# Patient Record
Sex: FEMALE | Race: WHITE | URBAN - METROPOLITAN AREA
[De-identification: names, ages, dates, MRNs, and addresses within clinical notes are randomized per-mention and may not be internally consistent; named-entity substitution may affect disease eponyms.]

---

## 2023-03-15 PROBLEM — F33.0 MAJOR DEPRESSIVE DISORDER, RECURRENT, MILD (HCC): Status: ACTIVE | Noted: 2023-03-15

## 2023-03-15 PROBLEM — F33.0 MAJOR DEPRESSIVE DISORDER, RECURRENT, MILD: Status: ACTIVE | Noted: 2023-03-15

## 2023-06-19 ENCOUNTER — TELEPHONE (OUTPATIENT)
Dept: FAMILY MEDICINE CLINIC | Facility: CLINIC | Age: 33
End: 2023-06-19

## 2023-06-19 NOTE — TELEPHONE ENCOUNTER
Pharmacy Arlene called to speak with nurse to get clarification on directions on medication hydrOXYzine 25 MG Oral Tab.     Please advise

## 2023-06-20 NOTE — TELEPHONE ENCOUNTER
Called and LMOM to call back that Dr Pierre Briggs would like her to come in to discuss her anxiety and using the atarax to control the anxiety.

## 2023-06-20 NOTE — TELEPHONE ENCOUNTER
If she is truly needing hydroxyzine TID for anxiety then I would recommend a visit so we can manage her anxiety a little better.  Thanks

## 2023-06-21 NOTE — TELEPHONE ENCOUNTER
Called and talked to patient and she usually takes this as a sleep aide but not every day. After some discussion she will plan on making an appointmetn to discuss this.

## 2023-08-14 ENCOUNTER — TELEMEDICINE (OUTPATIENT)
Dept: FAMILY MEDICINE CLINIC | Facility: CLINIC | Age: 33
End: 2023-08-14
Payer: MEDICAID

## 2023-08-14 DIAGNOSIS — G47.00 INSOMNIA, UNSPECIFIED TYPE: Primary | ICD-10-CM

## 2023-08-14 DIAGNOSIS — F39 MOOD DISORDER (HCC): ICD-10-CM

## 2023-08-14 RX ORDER — QUETIAPINE FUMARATE 25 MG/1
25 TABLET, FILM COATED ORAL NIGHTLY
Qty: 30 TABLET | Refills: 0 | Status: SHIPPED | OUTPATIENT
Start: 2023-08-14

## 2023-08-14 NOTE — PROGRESS NOTES
Telemedicine video encounter documentation    This video encounter was conducted with the patient's (or proxy's) verbal consent via secure, interactive audio and video telecommunications. The patient (or proxy) was instructed to have this encounter in a suitably private space and to only have persons present to whom they give permission to participate. In addition, patient identity was confirmed by use of name plus two identifiers. Chief Complaint:     No chief complaint on file. Subjective:     Radames Doyle is a 29year old female established patient. Video visit today for:    HPI      Insomnia. Patient reports difficulty falling and staying asleep. She has been using trazodone and hydroxyzine. Trazodone is not helping. She is concerned because she has gone 24 hours without sleeping at least once each week. Every night she has trouble falling asleep and trouble staying asleep. She denies feeling down and depressed, though does endorse passive thoughts of writing suicide note. Denies having a plan or means. Does not endorse intrusive thoughts. Cites reasons for living including support of family members and also she wants to be alive. MDQ positive for periods of time in which she felt more self-confident, periods of time when she got much less sleep and did not miss it, had racing thoughts, were easily distracted, and spent money getting herself into trouble. Does not endorse prior dx of bipolar disorder. Denies using drugs or alcohol. Patient Active Problem List:     Major depressive disorder, recurrent, mild (HCC)      hydrOXYzine 25 MG Oral Tab, Take 1 tablet (25 mg total) by mouth daily as needed for Anxiety. ! Tablet by mouth 3 times daily as needed, Disp: 30 tablet, Rfl: 0  escitalopram (LEXAPRO) 20 MG Oral Tab, Take 1 tablet (20 mg total) by mouth daily. , Disp: 90 tablet, Rfl: 3  traZODone 50 MG Oral Tab, Take 1 tablet (50 mg total) by mouth nightly., Disp: 90 tablet, Rfl: 3    No facility-administered medications prior to visit. Allergy and social history. ROS   See HPI for other ROS    Objective:     Vitals as reported by patient:  There is no height or weight on file to calculate BMI. Constitutional: well-appearing  Mental status: alert and oriented, affect normal  Respiratory: comfortable WOB      Assessment/Plan:     Diagnoses and all orders for this visit:    Insomnia, unspecified type - positive MDQ but more c/w hypomania. Risk assessment completed and negative. Stop trazodone, start low dose seroquel at bedtime for both insomnia and see if it helps with mood symptoms. Follow up closely in 2 weeks. Also referred to psychiatry for help with establishing a more clear diagnosis. Risks/benefits of seroquel discussed  -     QUEtiapine (SEROQUEL) 25 MG Oral Tab; Take 1 tablet (25 mg total) by mouth nightly. -     OP REFERRAL TO [facility]    Mood disorder (HCC)  -     QUEtiapine (SEROQUEL) 25 MG Oral Tab; Take 1 tablet (25 mg total) by mouth nightly. -     OP REFERRAL TO [facility]    No follow-ups on file. There are no Patient Instructions on file for this visit.

## 2023-08-15 ENCOUNTER — PATIENT MESSAGE (OUTPATIENT)
Dept: FAMILY MEDICINE CLINIC | Facility: CLINIC | Age: 33
End: 2023-08-15

## 2023-08-15 DIAGNOSIS — Z46.0 CONTACT LENS/GLASSES FITTING: ICD-10-CM

## 2023-08-15 DIAGNOSIS — Z13.5 SCREENING FOR EYE CONDITION: Primary | ICD-10-CM

## 2023-08-15 NOTE — TELEPHONE ENCOUNTER
From: Hector Carey  To: Loida Flanagan MD  Sent: 8/15/2023 4:01 PM CDT  Subject: vision service/optometry referral request     Hello,     I was needing a referral to go to a vision clinic (optometry) to get new glasses. The number for the facility is [phone]. Address is:     [place] [address]     I wear glasses but the prescription is way out of date and I need to be seen for new ones and an eye exam. Please let me know if any other information is needed.      Thank you,  Zheng

## 2023-08-16 ENCOUNTER — TELEPHONE (OUTPATIENT)
Dept: FAMILY MEDICINE CLINIC | Facility: CLINIC | Age: 33
End: 2023-08-16

## 2023-08-16 DIAGNOSIS — Z13.6 SCREENING FOR CARDIOVASCULAR CONDITION: Primary | ICD-10-CM

## 2023-08-16 NOTE — TELEPHONE ENCOUNTER
Please enter lab orders for the patient's upcoming physical appointment. Physical scheduled: Your appointments       Date & Time Appointment Department [facility])    Aug 25, 2023  3:00 PM CDT Adult Physical with Lucian Pop MD [facility], [address], [place] ([address])    PLEASE NOTE - Most insurances allow a Complete Physical once every 366 days. Please schedule accordingly. Please arrive 15 minutes prior to your scheduled appointment. Please also bring your Insurance card, Photo ID, and your medication bottles or a list of your current medication. If you no longer require this appointment, please contact your physician office to cancel. Kris Riddle [phone]           Preferred lab: [facility])     The patient has been notified to complete fasting labs prior to their physical appointment.

## 2023-09-11 DIAGNOSIS — R45.89 DEPRESSED MOOD: ICD-10-CM

## 2023-09-14 RX ORDER — ESCITALOPRAM OXALATE 20 MG/1
20 TABLET ORAL DAILY
Qty: 90 TABLET | Refills: 3 | OUTPATIENT
Start: 2023-09-14

## 2023-09-15 ENCOUNTER — LAB ENCOUNTER (OUTPATIENT)
Dept: LAB | Age: 33
End: 2023-09-15
Attending: STUDENT IN AN ORGANIZED HEALTH CARE EDUCATION/TRAINING PROGRAM
Payer: MEDICAID

## 2023-09-15 ENCOUNTER — OFFICE VISIT (OUTPATIENT)
Dept: FAMILY MEDICINE CLINIC | Facility: CLINIC | Age: 33
End: 2023-09-15
Payer: MEDICAID

## 2023-09-15 VITALS
RESPIRATION RATE: 16 BRPM | BODY MASS INDEX: 30.09 KG/M2 | HEART RATE: 64 BPM | TEMPERATURE: 98 F | SYSTOLIC BLOOD PRESSURE: 108 MMHG | WEIGHT: 185 LBS | DIASTOLIC BLOOD PRESSURE: 78 MMHG | HEIGHT: 65.75 IN

## 2023-09-15 DIAGNOSIS — Z23 NEED FOR VACCINATION: ICD-10-CM

## 2023-09-15 DIAGNOSIS — Z00.00 ENCOUNTER FOR ROUTINE HISTORY AND PHYSICAL EXAMINATION: Primary | ICD-10-CM

## 2023-09-15 DIAGNOSIS — Z13.6 SCREENING FOR CARDIOVASCULAR CONDITION: ICD-10-CM

## 2023-09-15 DIAGNOSIS — Z12.4 SCREENING FOR CERVICAL CANCER: ICD-10-CM

## 2023-09-15 LAB
ALBUMIN SERPL-MCNC: 4.1 G/DL (ref 3.4–5)
ALBUMIN/GLOB SERPL: 1.2 {RATIO} (ref 1–2)
ALP LIVER SERPL-CCNC: 68 U/L
ALT SERPL-CCNC: 23 U/L
ANION GAP SERPL CALC-SCNC: 5 MMOL/L (ref 0–18)
AST SERPL-CCNC: 16 U/L (ref 15–37)
BILIRUB SERPL-MCNC: 0.6 MG/DL (ref 0.1–2)
BUN BLD-MCNC: 14 MG/DL (ref 7–18)
CALCIUM BLD-MCNC: 9.7 MG/DL (ref 8.5–10.1)
CHLORIDE SERPL-SCNC: 108 MMOL/L (ref 98–112)
CHOLEST SERPL-MCNC: 245 MG/DL (ref ?–200)
CO2 SERPL-SCNC: 25 MMOL/L (ref 21–32)
CREAT BLD-MCNC: 0.93 MG/DL
EGFRCR SERPLBLD CKD-EPI 2021: 85 ML/MIN/1.73M2 (ref 60–?)
FASTING PATIENT LIPID ANSWER: YES
FASTING STATUS PATIENT QL REPORTED: YES
GLOBULIN PLAS-MCNC: 3.5 G/DL (ref 2.8–4.4)
GLUCOSE BLD-MCNC: 92 MG/DL (ref 70–99)
HDLC SERPL-MCNC: 53 MG/DL (ref 40–59)
LDLC SERPL CALC-MCNC: 169 MG/DL (ref ?–100)
NONHDLC SERPL-MCNC: 192 MG/DL (ref ?–130)
OSMOLALITY SERPL CALC.SUM OF ELEC: 286 MOSM/KG (ref 275–295)
POTASSIUM SERPL-SCNC: 3.7 MMOL/L (ref 3.5–5.1)
PROT SERPL-MCNC: 7.6 G/DL (ref 6.4–8.2)
RUBV IGG SER QL: POSITIVE
RUBV IGG SER-ACNC: >500 IU/ML (ref 10–?)
SODIUM SERPL-SCNC: 138 MMOL/L (ref 136–145)
TRIGL SERPL-MCNC: 129 MG/DL (ref 30–149)
VLDLC SERPL CALC-MCNC: 26 MG/DL (ref 0–30)

## 2023-09-15 PROCEDURE — 86762 RUBELLA ANTIBODY: CPT

## 2023-09-15 PROCEDURE — 90746 HEPB VACCINE 3 DOSE ADULT IM: CPT | Performed by: STUDENT IN AN ORGANIZED HEALTH CARE EDUCATION/TRAINING PROGRAM

## 2023-09-15 PROCEDURE — 88175 CYTOPATH C/V AUTO FLUID REDO: CPT | Performed by: STUDENT IN AN ORGANIZED HEALTH CARE EDUCATION/TRAINING PROGRAM

## 2023-09-15 PROCEDURE — 36415 COLL VENOUS BLD VENIPUNCTURE: CPT

## 2023-09-15 PROCEDURE — 86735 MUMPS ANTIBODY: CPT

## 2023-09-15 PROCEDURE — 86787 VARICELLA-ZOSTER ANTIBODY: CPT

## 2023-09-15 PROCEDURE — 3078F DIAST BP <80 MM HG: CPT | Performed by: STUDENT IN AN ORGANIZED HEALTH CARE EDUCATION/TRAINING PROGRAM

## 2023-09-15 PROCEDURE — 80053 COMPREHEN METABOLIC PANEL: CPT

## 2023-09-15 PROCEDURE — 86765 RUBEOLA ANTIBODY: CPT

## 2023-09-15 PROCEDURE — 87624 HPV HI-RISK TYP POOLED RSLT: CPT | Performed by: STUDENT IN AN ORGANIZED HEALTH CARE EDUCATION/TRAINING PROGRAM

## 2023-09-15 PROCEDURE — 3074F SYST BP LT 130 MM HG: CPT | Performed by: STUDENT IN AN ORGANIZED HEALTH CARE EDUCATION/TRAINING PROGRAM

## 2023-09-15 PROCEDURE — 3008F BODY MASS INDEX DOCD: CPT | Performed by: STUDENT IN AN ORGANIZED HEALTH CARE EDUCATION/TRAINING PROGRAM

## 2023-09-15 PROCEDURE — 99395 PREV VISIT EST AGE 18-39: CPT | Performed by: STUDENT IN AN ORGANIZED HEALTH CARE EDUCATION/TRAINING PROGRAM

## 2023-09-15 PROCEDURE — 90471 IMMUNIZATION ADMIN: CPT | Performed by: STUDENT IN AN ORGANIZED HEALTH CARE EDUCATION/TRAINING PROGRAM

## 2023-09-15 PROCEDURE — 80061 LIPID PANEL: CPT

## 2023-09-18 LAB
HPV I/H RISK 1 DNA SPEC QL NAA+PROBE: NEGATIVE
MEV IGG SER-ACNC: 32.6 AU/ML (ref 16.5–?)
MUV IGG SER IA-ACNC: 253 AU/ML (ref 11–?)
VZV IGG SER IA-ACNC: 323.5 (ref 165–?)

## 2023-09-19 PROBLEM — K59.09 CHRONIC CONSTIPATION: Status: ACTIVE | Noted: 2018-05-18

## 2023-11-02 ENCOUNTER — PATIENT MESSAGE (OUTPATIENT)
Dept: FAMILY MEDICINE CLINIC | Facility: CLINIC | Age: 33
End: 2023-11-02

## 2024-09-05 ENCOUNTER — OFFICE VISIT (OUTPATIENT)
Dept: FAMILY MEDICINE CLINIC | Facility: CLINIC | Age: 34
End: 2024-09-05
Payer: MEDICAID

## 2024-09-05 VITALS
DIASTOLIC BLOOD PRESSURE: 66 MMHG | HEIGHT: 65.75 IN | RESPIRATION RATE: 16 BRPM | SYSTOLIC BLOOD PRESSURE: 100 MMHG | WEIGHT: 186.63 LBS | HEART RATE: 84 BPM | TEMPERATURE: 99 F | BODY MASS INDEX: 30.35 KG/M2

## 2024-09-05 DIAGNOSIS — K21.9 GASTROESOPHAGEAL REFLUX DISEASE, UNSPECIFIED WHETHER ESOPHAGITIS PRESENT: Primary | ICD-10-CM

## 2024-09-05 PROCEDURE — 99213 OFFICE O/P EST LOW 20 MIN: CPT | Performed by: STUDENT IN AN ORGANIZED HEALTH CARE EDUCATION/TRAINING PROGRAM

## 2024-09-05 RX ORDER — TRAZODONE HYDROCHLORIDE 50 MG/1
50 TABLET, FILM COATED ORAL NIGHTLY
COMMUNITY

## 2024-09-05 RX ORDER — METHYLPHENIDATE HYDROCHLORIDE EXTENDED RELEASE 20 MG/1
20 TABLET ORAL EVERY MORNING
COMMUNITY

## 2024-09-05 RX ORDER — FLUOXETINE 40 MG/1
40 CAPSULE ORAL DAILY
COMMUNITY

## 2024-09-05 NOTE — PROGRESS NOTES
[facility]    Chief Complaint   Patient presents with    Reflux     Had Covid all of July and is left with occasional cough. Eating acidic things make her cough and sometimes vomit.        HPI:   Lucila Rader is 33 year old patient presenting for the followin. Subacute cough x2m duration. Alsonotes nausea with occaisonal vomiting when she eats acidic food. She started by taking omeprazole 20 mg daily and found this helpful but then decided to stop it and come in to speak with a physician about symptoms. Does have some pharyngeal drainage but no congestion. No fevers. No wheezing. Some dyspnea with staircases but is able to walk regularly ever yday without dyspnea.    PMH:  Patient Active Problem List   Diagnosis    Major depressive disorder, recurrent, mild (HCC)    PTSD (post-traumatic stress disorder)    CARMENCITA (generalized anxiety disorder)    Chronic constipation     No past medical history on file.       SH: reviewed     FH: reviewed        ROS: full 10 point review of systems done and otherwise negative.      Healthcare Maintenance:       PE:  Vital Signs    24 1344   BP: 100/66   Pulse: 84   Resp: 16   Temp: 99 °F (37.2 °C)     Wt Readings from Last 3 Encounters:   24 186 lb 9.6 oz (84.6 kg)   09/15/23 185 lb (83.9 kg)   03/15/23 178 lb 9.6 oz (81 kg)     Body mass index is 30.35 kg/m².     Physical Exam:  GEN: Well-appearing, NAD, nontoxic  HEENT: NC/AT, PERRL, EOMI, oropharynx clear without erythema or exudate, MMM  CARD: RRR, no m/r/g  PULM: CTA angel  ABD: soft, nt, nd  EXT: No gross deformity  NEURO: no gross deficit  PSYCH: normal affect, thought process linear     No visits with results within 4 Week(s) from this visit.   Latest known visit with results is:   [facility] Lab Encounter on 09/15/2023   Component Date Value Ref Range Status    Glucose 09/15/2023 92  70 - 99 mg/dL Final    Sodium 09/15/2023 138  136 - 145 mmol/L Final    Potassium 09/15/2023 3.7  3.5 - 5.1  mmol/L Final    Chloride 09/15/2023 108  98 - 112 mmol/L Final    CO2 09/15/2023 25.0  21.0 - 32.0 mmol/L Final    Anion Gap 09/15/2023 5  0 - 18 mmol/L Final    BUN 09/15/2023 14  7 - 18 mg/dL Final    Creatinine 09/15/2023 0.93  0.55 - 1.02 mg/dL Final    Calcium, Total 09/15/2023 9.7  8.5 - 10.1 mg/dL Final    Calculated Osmolality 09/15/2023 286  275 - 295 mOsm/kg Final    eGFR-Cr 09/15/2023 85  >=60 mL/min/1.73m2 Final    AST 09/15/2023 16  15 - 37 U/L Final    ALT 09/15/2023 23  13 - 56 U/L Final    Alkaline Phosphatase 09/15/2023 68  37 - 98 U/L Final    Bilirubin, Total 09/15/2023 0.6  0.1 - 2.0 mg/dL Final    Total Protein 09/15/2023 7.6  6.4 - 8.2 g/dL Final    Albumin 09/15/2023 4.1  3.4 - 5.0 g/dL Final    Globulin  09/15/2023 3.5  2.8 - 4.4 g/dL Final    A/G Ratio 09/15/2023 1.2  1.0 - 2.0 Final    Patient Fasting for CMP? 09/15/2023 Yes   Final    Cholesterol, Total 09/15/2023 245 (H)  <200 mg/dL Final    HDL Cholesterol 09/15/2023 53  40 - 59 mg/dL Final    Triglycerides 09/15/2023 129  30 - 149 mg/dL Final    LDL Cholesterol 09/15/2023 169 (H)  <100 mg/dL Final    VLDL 09/15/2023 26  0 - 30 mg/dL Final    Non HDL Chol 09/15/2023 192 (H)  <130 mg/dL Final    Patient Fasting for Lipid? 09/15/2023 Yes   Final    Rubeola IgG Immunity 09/15/2023 32.6  >=16.5 AU/mL Final    Mumps IgG Immuity 09/15/2023 253.0  >=11.0 AU/mL Final    Rubella IgG 09/15/2023 Positive  Positive Final    Rubella IgG Quantitative 09/15/2023 >500.0  >=10 IU/mL Final    V. Zoster IgG Immunity 09/15/2023 323.50  >165.00 Final        A/P: Lucila Rader is 33 year old presenting for the followin. GERD. Avoid dietary triggers. Start omeprazole 20 mg daily x2-4 weeks until symptoms resolve and then swithc to PRN pepcid. RTC if no improvement        Outpatient Encounter Medications as of 2024   Medication Sig Dispense Refill    FLUoxetine HCl 40 MG Oral Cap Take 1 capsule (40 mg total) by mouth daily.      traZODone 50 MG Oral  Tab Take 1 tablet (50 mg total) by mouth nightly.      Methylphenidate HCl ER 20 MG Oral Tab CR Take 1 tablet (20 mg total) by mouth every morning.      hydrOXYzine 25 MG Oral Tab Take 1 tablet (25 mg total) by mouth daily as needed for Anxiety. ! Tablet by mouth 3 times daily as needed 30 tablet 0    QUEtiapine (SEROQUEL) 25 MG Oral Tab Take 1 tablet (25 mg total) by mouth nightly. 30 tablet 0     No facility-administered encounter medications on file as of 9/5/2024.           Side effects, risks and benefits of medications were explained.  The patient or responsible adult showed the ability to learn, asked appropriate questions.  There were no barriers to learning and they verbalized understanding of the treatment plan.     Medication list provided to patient and /or family member.        Venice Galeano MD

## 2024-09-06 RX ORDER — PROPRANOLOL HYDROCHLORIDE 10 MG/1
10 TABLET ORAL 2 TIMES DAILY PRN
COMMUNITY
Start: 2024-04-18

## 2024-09-06 RX ORDER — ESCITALOPRAM OXALATE 20 MG/1
20 TABLET ORAL DAILY
COMMUNITY
Start: 2024-03-12

## 2024-09-25 ENCOUNTER — TELEPHONE (OUTPATIENT)
Dept: FAMILY MEDICINE CLINIC | Facility: CLINIC | Age: 34
End: 2024-09-25

## 2024-09-25 DIAGNOSIS — Z13.6 SCREENING FOR CARDIOVASCULAR CONDITION: Primary | ICD-10-CM

## 2024-09-25 DIAGNOSIS — Z00.00 LABORATORY EXAM ORDERED AS PART OF ROUTINE GENERAL MEDICAL EXAMINATION: ICD-10-CM

## 2024-09-25 NOTE — TELEPHONE ENCOUNTER
Please enter lab orders for the patient's upcoming physical appointment.     Physical scheduled:   Your appointments       Date & Time Appointment Department ([place])    Oct 07, 2024 12:00 PM CDT Adult Physical with Venice Galeano MD [facility] ([facility])    PLEASE NOTE - Most insurances allow a Complete Physical once every 366 days. Please schedule accordingly.    Please arrive 15 minutes prior to your scheduled appointment. Please also bring your Insurance card, Photo ID, and your medication bottles or a list of your current medication.    If you no longer require this appointment, please contact your physician office to cancel.              [facility] [address]  [address] [address]  [phone]           Preferred lab: [facility] ([facility])     The patient has been notified to complete fasting labs prior to their physical appointment.

## 2024-11-27 ENCOUNTER — E-VISIT (OUTPATIENT)
Dept: TELEHEALTH | Age: 34
End: 2024-11-27
Payer: MEDICAID

## 2024-11-27 ENCOUNTER — OFFICE VISIT (OUTPATIENT)
Dept: FAMILY MEDICINE CLINIC | Facility: CLINIC | Age: 34
End: 2024-11-27
Payer: MEDICAID

## 2024-11-27 VITALS
DIASTOLIC BLOOD PRESSURE: 70 MMHG | TEMPERATURE: 99 F | WEIGHT: 186 LBS | BODY MASS INDEX: 29.89 KG/M2 | OXYGEN SATURATION: 99 % | HEART RATE: 56 BPM | SYSTOLIC BLOOD PRESSURE: 100 MMHG | RESPIRATION RATE: 16 BRPM | HEIGHT: 66 IN

## 2024-11-27 DIAGNOSIS — N89.8 VAGINAL DISCHARGE: ICD-10-CM

## 2024-11-27 DIAGNOSIS — K58.1 IRRITABLE BOWEL SYNDROME WITH CONSTIPATION: ICD-10-CM

## 2024-11-27 DIAGNOSIS — R39.9 UTI SYMPTOMS: Primary | ICD-10-CM

## 2024-11-27 DIAGNOSIS — N89.8 VAGINAL ODOR: ICD-10-CM

## 2024-11-27 LAB
APPEARANCE: CLEAR
GLUCOSE (URINE DIPSTICK): NEGATIVE MG/DL
MULTISTIX LOT#: ABNORMAL NUMERIC
NITRITE, URINE: NEGATIVE
OCCULT BLOOD: NEGATIVE
PH, URINE: 6.5 (ref 4.5–8)
PROTEIN (URINE DIPSTICK): 30 MG/DL
SPECIFIC GRAVITY: 1.02 (ref 1–1.03)
URINE-COLOR: YELLOW
UROBILINOGEN,SEMI-QN: 0.2 MG/DL (ref 0–1.9)

## 2024-11-27 PROCEDURE — 99213 OFFICE O/P EST LOW 20 MIN: CPT | Performed by: NURSE PRACTITIONER

## 2024-11-27 PROCEDURE — 87086 URINE CULTURE/COLONY COUNT: CPT | Performed by: NURSE PRACTITIONER

## 2024-11-27 PROCEDURE — 81003 URINALYSIS AUTO W/O SCOPE: CPT | Performed by: NURSE PRACTITIONER

## 2024-11-27 RX ORDER — METRONIDAZOLE 7.5 MG/G
1 GEL VAGINAL NIGHTLY
Qty: 70 G | Refills: 0 | Status: SHIPPED | OUTPATIENT
Start: 2024-11-27 | End: 2024-12-02

## 2024-11-27 RX ORDER — FLUCONAZOLE 150 MG/1
TABLET ORAL
Qty: 2 TABLET | Refills: 0 | Status: SHIPPED | OUTPATIENT
Start: 2024-11-27

## 2024-11-27 NOTE — PATIENT INSTRUCTIONS
Start Diflucan tablet by mouth tonight and again in five days  Use metronidazole gel to vaginal area nightly x 5 nights  Clean toy and adjust as discussed, use warm pack to abdomen, use Tylenol for pain  Help IBS symptoms by drinking LOTS more water (90 oz should be the minimum DAILY for your weight without having any issues) and this will help both the bladder issue as well as the IBS.  We will send a urine culture and prescribe medication if needed. Follow up with Dr. Galeano if symptoms not fully resolved or seek urgent care if symptoms worsen despite treatment.

## 2024-11-27 NOTE — PROGRESS NOTES
Patient here for urinary urgency, mild pain above suprapubic area for the last 5-6 days. She notes using a new penetrative toy for pleasure at beginning of symptoms and since then has noted some increased pelvic pressure, urinary frequency and urgency as well as odor to the urine. Notes some vaginal discharge/odor as well. Denies fever, new back pain, malaise, nausea or vomiting. Has history of IBS-C, currently with constipation. Has tried heating pad with good temporary relief  No past medical history on file.  No past surgical history on file.  Medications Ordered Prior to Encounter[1]  /70   Pulse 56   Temp 98.8 °F (37.1 °C)   Resp 16   Ht 5' 6\" (1.676 m)   Wt 186 lb (84.4 kg)   LMP 08/29/2024 (Approximate)   SpO2 99%   BMI 30.02 kg/m²     GENERAL: well developed, well nourished,in no apparent distress  SKIN: no rashes,no suspicious lesions  HEENT: PERLLA, conjunctiva clear, atraumatic, normocephalic,  LUNGS: clear to auscultation, no wheeze, rhonchi or rales noted  CARDIO: RRR without murmur  GI: bowel sounds diminished all quadrants, mildly distended, notes mild tenderness at RUQ/RLQ and suprapubic pressure with sensation to urinate  MUSCULOSKELETAL: no laxity of joints noted, normal gait  EXTREMITIES: no cyanosis, clubbing or edema  NEURO: CN 2-12 intact,. EOM intact.  Results for orders placed or performed in visit on 11/27/24   URINALYSIS, AUTO, W/O SCOPE    Collection Time: 11/27/24  2:38 PM   Result Value Ref Range    Glucose Urine Negative Negative mg/dL    Bilirubin Urine Small (A) Negative    Ketones, UA Trace Negative - Trace mg/dL    Spec Gravity 1.025 1.005 - 1.030    Blood Urine Negative Negative    PH Urine 6.5 5.0 - 8.0    Protein Urine 30 (A) Negative - Trace mg/dL    Urobilinogen Urine 0.2 0.2 - 1.0 mg/dL    Nitrite Urine Negative Negative    Leukocyte Esterase Urine Small (A) Negative    APPEARANCE clear Clear    Color Urine yellow Yellow    Multistix Lot# [id number] Numeric     Multistix Expiration Date 6/30/25 Date     Lucila was seen today for urinary symptoms.    Diagnoses and all orders for this visit:    UTI symptoms  -     Urine Culture, Routine; Future  -     URINALYSIS, AUTO, W/O SCOPE  -     Urine Culture, Routine    Vaginal discharge  -     fluconazole (DIFLUCAN) 150 MG Oral Tab; Take one tablet by mouth today, then one tablet by mouth on day 5.  -     metroNIDAZOLE 0.75 % Vaginal Gel; Place 1 Application vaginally nightly for 5 days.    Vaginal odor  -     fluconazole (DIFLUCAN) 150 MG Oral Tab; Take one tablet by mouth today, then one tablet by mouth on day 5.  -     metroNIDAZOLE 0.75 % Vaginal Gel; Place 1 Application vaginally nightly for 5 days.    Irritable bowel syndrome with constipation        Discussed using heating pad, adjusting penetrative toy, cleaning well. Meds as above with instructions for use as below. Comfort care discussed. To ER or IC if symptoms worsen or follow up with PCP or ob/gyne. Urine culture sent and will treat with antibiotics if needed. Patient verbalized understanding and agrees to plan.   Patient Instructions   Start Diflucan tablet by mouth tonight and again in five days  Use metronidazole gel to vaginal area nightly x 5 nights  Clean toy and adjust as discussed, use warm pack to abdomen, use Tylenol for pain  Help IBS symptoms by drinking LOTS more water (90 oz should be the minimum DAILY for your weight without having any issues) and this will help both the bladder issue as well as the IBS.  We will send a urine culture and prescribe medication if needed. Follow up with Dr. Galeano if symptoms not fully resolved or seek urgent care if symptoms worsen despite treatment.         [1]   Current Outpatient Medications on File Prior to Visit   Medication Sig Dispense Refill    propranolol 10 MG Oral Tab Take 1 tablet (10 mg total) by mouth 2 (two) times daily as needed.      FLUoxetine HCl 40 MG Oral Cap Take 1 capsule (40 mg total) by mouth daily.       traZODone 50 MG Oral Tab Take 1 tablet (50 mg total) by mouth nightly.      Methylphenidate HCl ER 20 MG Oral Tab CR Take 1 tablet (20 mg total) by mouth every morning.      hydrOXYzine 25 MG Oral Tab Take 1 tablet (25 mg total) by mouth daily as needed for Anxiety. ! Tablet by mouth 3 times daily as needed 30 tablet 0     No current facility-administered medications on file prior to visit.

## 2024-11-27 NOTE — PROGRESS NOTES
Lucila Rader is a 34 year old female.  HPI:   See answers to questions above.     Current Outpatient Medications   Medication Sig Dispense Refill    escitalopram 20 MG Oral Tab Take 1 tablet (20 mg total) by mouth daily.      propranolol 10 MG Oral Tab Take 1 tablet (10 mg total) by mouth 2 (two) times daily as needed.      FLUoxetine HCl 40 MG Oral Cap Take 1 capsule (40 mg total) by mouth daily.      traZODone 50 MG Oral Tab Take 1 tablet (50 mg total) by mouth nightly.      Methylphenidate HCl ER 20 MG Oral Tab CR Take 1 tablet (20 mg total) by mouth every morning.      hydrOXYzine 25 MG Oral Tab Take 1 tablet (25 mg total) by mouth daily as needed for Anxiety. ! Tablet by mouth 3 times daily as needed 30 tablet 0      No past medical history on file.   No past surgical history on file.   No family history on file.   Social History:  Social History     Socioeconomic History    Marital status: Single   Tobacco Use    Smoking status: Never    Smokeless tobacco: Never   Vaping Use    Vaping status: Never Used   Substance and Sexual Activity    Alcohol use: Yes     Comment: 2 drinks every 3 months    Drug use: Never   Other Topics Concern    Caffeine Concern Yes     Comment: 16 oz a day    Exercise Yes     Comment: 5 days a week walking    Seat Belt Yes    Self-Exams No         ASSESSMENT AND PLAN:     Encounter Diagnosis   Name Primary?    UTI symptoms Yes     Pt with UTI sx and lower abd discomfort. Discussed options for testing via lab or in person visit. Pt prefers in person visit for more immediate results. Has 2:30 appt at [facility] today.      Meds & Refills for this Visit:  Requested Prescriptions      No prescriptions requested or ordered in this encounter       Duration of  the service:  5 minutes

## 2024-12-04 ENCOUNTER — TELEPHONE (OUTPATIENT)
Dept: FAMILY MEDICINE CLINIC | Facility: CLINIC | Age: 34
End: 2024-12-04

## 2025-03-17 ENCOUNTER — TELEPHONE (OUTPATIENT)
Dept: FAMILY MEDICINE CLINIC | Facility: CLINIC | Age: 35
End: 2025-03-17

## 2025-03-17 DIAGNOSIS — Z13.0 SCREENING FOR BLOOD DISEASE: Primary | ICD-10-CM

## 2025-03-17 DIAGNOSIS — Z13.228 SCREENING FOR METABOLIC DISORDER: ICD-10-CM

## 2025-03-17 DIAGNOSIS — Z13.220 SCREENING FOR LIPID DISORDERS: ICD-10-CM

## 2025-03-17 DIAGNOSIS — Z13.29 SCREENING FOR THYROID DISORDER: ICD-10-CM

## 2025-03-17 NOTE — TELEPHONE ENCOUNTER
Please enter lab orders for the patient's upcoming physical appointment.     Physical scheduled:   Your appointments       Date & Time Appointment Department ([place])    Apr 18, 2025 2:00 PM CDT Adult Physical with Venice Galeano MD [facility] ([facility])    PLEASE NOTE - Most insurances allow a Complete Physical once every 366 days. Please schedule accordingly.    Please arrive 15 minutes prior to your scheduled appointment. Please also bring your Insurance card, Photo ID, and your medication bottles or a list of your current medication.    If you no longer require this appointment, please contact your physician office to cancel.              [facility] [address]  [address] [address]  [phone]           Preferred lab: [facility] ([facility])     The patient has been notified to complete fasting labs prior to their physical appointment.

## 2025-06-18 ENCOUNTER — OFFICE VISIT (OUTPATIENT)
Dept: FAMILY MEDICINE CLINIC | Facility: CLINIC | Age: 35
End: 2025-06-18
Payer: MEDICAID

## 2025-06-18 VITALS
DIASTOLIC BLOOD PRESSURE: 58 MMHG | WEIGHT: 179.19 LBS | SYSTOLIC BLOOD PRESSURE: 103 MMHG | OXYGEN SATURATION: 99 % | HEART RATE: 97 BPM | HEIGHT: 66 IN | BODY MASS INDEX: 28.8 KG/M2

## 2025-06-18 DIAGNOSIS — B35.4 TINEA CORPORIS: ICD-10-CM

## 2025-06-18 DIAGNOSIS — Z00.00 ENCOUNTER FOR ROUTINE HISTORY AND PHYSICAL EXAMINATION: Primary | ICD-10-CM

## 2025-06-18 PROCEDURE — 99395 PREV VISIT EST AGE 18-39: CPT | Performed by: STUDENT IN AN ORGANIZED HEALTH CARE EDUCATION/TRAINING PROGRAM

## 2025-06-18 RX ORDER — CLOTRIMAZOLE 1 %
1 CREAM (GRAM) TOPICAL 2 TIMES DAILY
Qty: 42 G | Refills: 0 | Status: SHIPPED | OUTPATIENT
Start: 2025-06-18

## 2025-06-18 RX ORDER — METHYLPHENIDATE HYDROCHLORIDE 54 MG/1
TABLET, EXTENDED RELEASE ORAL
COMMUNITY
Start: 2025-05-28

## 2025-06-18 RX ORDER — FLUOXETINE HYDROCHLORIDE 60 MG/1
TABLET, FILM COATED ORAL; ORAL
COMMUNITY
Start: 2025-06-17

## 2025-06-18 NOTE — PROGRESS NOTES
[facility] - [place]    CC: yearly exam     HPI: Lucila Rader is 34 year old female here for a yearly physical.    History of Present Illness  Lucila Rader is a 34 year old female who presents for an annual physical exam.    She experiences seasonal allergies with a persistent cough and postnasal drip since April, relieved by Zyrtec. She reports chronic fatigue and exhaustion, worsened since graduation, and takes Concerta, fluoxetine, propranolol, and trazodone as needed. She has no current symptoms of reflux or anxiety but struggles with balancing exhaustion and sleeplessness.    Her family history includes breast cancer in her aunt and mother, with her mother also having multiple sclerosis. She experiences skin sensitivity in the armpit area due to shaving and has a persistent skin condition with pattern changes but no itchiness.    She denies alcohol, drug, and tobacco use, consumes about 20 ounces of coffee daily, is not sexually active, and identifies as a lesbian. Her menstrual periods are regular and manageable, with the last one at the beginning of the month.       Problem list today  1. Healthcare maintenance.  Exercise - taking walks.  Sunscreen -.  Caffeine - unchanged, about 20 oz per day.  Advanced directives-  2. Mood disorder/ Anxiety/PTSD, MDD      Wt Readings from Last 6 Encounters:   06/18/25 179 lb 3.2 oz (81.3 kg)   11/27/24 186 lb (84.4 kg)   09/05/24 186 lb 9.6 oz (84.6 kg)   09/15/23 185 lb (83.9 kg)   03/15/23 178 lb 9.6 oz (81 kg)       Health Habits:  Tobacco use:  reports that she has never smoked. She has never used smokeless tobacco.  Alcohol Use. None  Drug us: none  Dental Exam. UTD  Eye Exam. UTD    GYN:  Birth Control: not sexually active with men  Menstrual/Menopausal Hx. Regular manageable    PMH:  Problem List[1]  Past Medical History[2]      Healthcare Maintenance:  Health Maintenance   Topic Date Due    HPV Vaccines Ages 27-45 (2 - 3-dose series) 01/31/2020     COVID-19 Vaccine (2 - 2024-25 season) 09/01/2024    Annual Physical  09/15/2024    Influenza Vaccine (Season Ended) 10/01/2025    Pap Smear  09/15/2026    DTaP,Tdap,and Td Vaccines (3 - Td or Tdap) 01/03/2030    Annual Depression Screening  Completed    Pneumococcal Vaccine: Birth to 50yrs  Aged Out    Meningococcal B Vaccine  Aged Out          Last Physical 06/18/25   Problem List[3]  Past Medical History[4]      SH: reviewed     FH: reviewed     Social History     Social History Narrative    Not on file        ROS: full 10 point review of systems done and otherwise negative.         PE:  Vital Signs    06/18/25 1535   BP: 103/58   Pulse: 97     Wt Readings from Last 3 Encounters:   06/18/25 179 lb 3.2 oz (81.3 kg)   11/27/24 186 lb (84.4 kg)   09/05/24 186 lb 9.6 oz (84.6 kg)     Body mass index is 28.92 kg/m².     General: Comfortable, pleasant, NAD, appears stated age  HEENT.  NC/AT, no conjunctival injection, TMs clear, oropharynx without erythema or exudate, neck supple, no LAD or thyromegaly  CV.  RRR, no m/r/g  Pulm. CTAB, no W/R/R, comfortable work of breathing, speaks in full sentences  Abdomen. Soft, nt, nd  .  deferred  Extremities.  2+ DP pulses, no edema  Skin.  No concerning moles. Pale erythematous rash with satellite lesions over abdominal wall and under breast    Physical Exam  BREAST: Breast tissue normal, no skin changes, no masses palpated.        No visits with results within 4 Week(s) from this visit.   Latest known visit with results is:   Office Visit on 11/27/2024   Component Date Value Ref Range Status    Glucose Urine 11/27/2024 Negative  Negative mg/dL Final    Bilirubin Urine 11/27/2024 Small (A)  Negative Final    Ketones, UA 11/27/2024 Trace  Negative - Trace mg/dL Final    Spec Gravity 11/27/2024 1.025  1.005 - 1.030 Final    Blood Urine 11/27/2024 Negative  Negative Final    PH Urine 11/27/2024 6.5  5.0 - 8.0 Final    Protein Urine 11/27/2024 30 (A)  Negative - Trace mg/dL Final     Urobilinogen Urine 11/27/2024 0.2  0.2 - 1.0 mg/dL Final    Nitrite Urine 11/27/2024 Negative  Negative Final    Leukocyte Esterase Urine 11/27/2024 Small (A)  Negative Final    APPEARANCE 11/27/2024 clear  Clear Final    Color Urine 11/27/2024 yellow  Yellow Final    Multistix Lot# 11/27/2024 [id number]  Numeric Final    Multistix Expiration Date 11/27/2024 6/30/25  Date Final    Urine Culture 11/27/2024 <10,000 CFU/ML Gram negative pio   Final        A/P: Lucila Rader is 34 year old yo female here for complete physical.  1. Healthcare Maintenance. Discussed eye exam, dentist visit q6 months, sunscreen, exercise at least 5x/week, 30 minutes daily, and limiting caffeine intake.   Assessment & Plan  Allergic rhinitis  Symptoms resolved with Zyrtec, confirming allergic rhinitis.  - Continue Zyrtec as needed for allergy symptoms.    Major depressive disorder, recurrent, mild  Persistent exhaustion may relate to depression or medication side effects. Currently on fluoxetine and trazodone.  - Continue fluoxetine 60 mg daily.  - Use trazodone 50 mg as needed for sleep.    Generalized anxiety disorder (CARMENCITA)  Caffeine intake may exacerbate anxiety symptoms. Advised to monitor for increased anxiety or reflux symptoms due to caffeine.  - managed by psychiatry  - Monitor and reduce caffeine intake if anxiety or reflux symptoms increase.    Tinea corporis  Long-standing axillary skin changes possibly due to shaving or bacterial overgrowth. Offered topical antifungal treatment.  - Prescribe topical antifungal for axillary skin changes.  - Instruct to use the topical daily and continue for a week after symptoms resolve.    General Health Maintenance  Routine blood work including CBC, metabolic panel, thyroid, and cholesterol is due. Routine blood work is not urgent but should be done fasting.  - Advise to complete routine blood work at a convenient time, preferably fasting.  - Encourage continued regular dental and vision  exams.       Encounter Medications[5]        Side effects, risks and benefits of medications were explained.  The patient or responsible adult showed the ability to learn, asked appropriate questions.  There were no barriers to learning and they verbalized understanding of the treatment plan.     Medication list provided to patient and /or family member.     This note was created in part by Dragon recognition software.  Please excuse errors.                 [1]   Patient Active Problem List  Diagnosis    Major depressive disorder, recurrent, mild    PTSD (post-traumatic stress disorder)    CARMENCITA (generalized anxiety disorder)    Chronic constipation   [2] No past medical history on file.  [3]   Patient Active Problem List  Diagnosis    Major depressive disorder, recurrent, mild    PTSD (post-traumatic stress disorder)    CARMENCITA (generalized anxiety disorder)    Chronic constipation   [4] No past medical history on file.  [5]   Outpatient Encounter Medications as of 6/18/2025   Medication Sig Dispense Refill    FLUoxetine HCl 60 MG Oral Tab       CONCERTA 54 MG Oral Tab CR       fluconazole (DIFLUCAN) 150 MG Oral Tab Take one tablet by mouth today, then one tablet by mouth on day 5. 2 tablet 0    propranolol 10 MG Oral Tab Take 1 tablet (10 mg total) by mouth 2 (two) times daily as needed.      traZODone 50 MG Oral Tab Take 1 tablet (50 mg total) by mouth nightly.      Methylphenidate HCl ER 20 MG Oral Tab CR Take 1 tablet (20 mg total) by mouth every morning.      FLUoxetine 20 MG Oral Cap TAKE ONE CAPSULE BY MOUTH DAILY. TOTAL DAILY DOSE 60MG (Patient not taking: Reported on 6/18/2025)      FLUoxetine HCl 40 MG Oral Cap Take 1 capsule (40 mg total) by mouth daily. (Patient not taking: Reported on 6/18/2025)      hydrOXYzine 25 MG Oral Tab Take 1 tablet (25 mg total) by mouth daily as needed for Anxiety. ! Tablet by mouth 3 times daily as needed (Patient not taking: Reported on 6/18/2025) 30 tablet 0     No  facility-administered encounter medications on file as of 6/18/2025.

## 2025-06-18 NOTE — PROGRESS NOTES
The following individual(s) verbally consented to be recorded using ambient AI listening technology and understand that they can each withdraw their consent to this listening technology at any point by asking the clinician to turn off or pause the recording:    Patient name: Lucila Rosaga

## 2025-07-03 ENCOUNTER — PATIENT MESSAGE (OUTPATIENT)
Dept: FAMILY MEDICINE CLINIC | Facility: CLINIC | Age: 35
End: 2025-07-03

## 2025-07-11 ENCOUNTER — OFFICE VISIT (OUTPATIENT)
Dept: FAMILY MEDICINE CLINIC | Facility: CLINIC | Age: 35
End: 2025-07-11
Payer: MEDICAID

## 2025-07-11 VITALS
SYSTOLIC BLOOD PRESSURE: 104 MMHG | DIASTOLIC BLOOD PRESSURE: 72 MMHG | HEART RATE: 90 BPM | OXYGEN SATURATION: 99 % | HEIGHT: 66.75 IN | WEIGHT: 174.81 LBS | BODY MASS INDEX: 27.44 KG/M2

## 2025-07-11 DIAGNOSIS — M54.50 LUMBAR BACK PAIN: ICD-10-CM

## 2025-07-11 DIAGNOSIS — S29.011A PECTORALIS MUSCLE STRAIN, INITIAL ENCOUNTER: Primary | ICD-10-CM

## 2025-07-11 PROCEDURE — 99213 OFFICE O/P EST LOW 20 MIN: CPT | Performed by: STUDENT IN AN ORGANIZED HEALTH CARE EDUCATION/TRAINING PROGRAM

## 2025-07-11 RX ORDER — CYCLOBENZAPRINE HCL 5 MG
5 TABLET ORAL NIGHTLY PRN
Qty: 10 TABLET | Refills: 0 | Status: SHIPPED | OUTPATIENT
Start: 2025-07-11

## 2025-07-11 RX ORDER — IBUPROFEN 600 MG/1
600 TABLET, FILM COATED ORAL EVERY 6 HOURS PRN
Qty: 20 TABLET | Refills: 0 | Status: SHIPPED | OUTPATIENT
Start: 2025-07-11

## 2025-07-11 RX ORDER — BUSPIRONE HYDROCHLORIDE 7.5 MG/1
7.5 TABLET ORAL DAILY
COMMUNITY
Start: 2025-06-26

## 2025-07-17 NOTE — PROGRESS NOTES
[facility] - [place]    Chief Complaint   Patient presents with    Shoulder Pain     Shoulder pain 3 weeks ago   back started 3 years ago  When goes to reaching  motion it mores from shoulder to chest   When moves up and sown it moves to back           HPI:   Lucila Rader is 34 year old patient presenting for the following:    History of Present Illness  Lucila Rader is a 34 year old female who presents with right shoulder and back pain.    Right shoulder and pectoral pain  - Onset approximately three weeks ago after cleaning her apartment  - Pain localized to the right shoulder, radiating towards the chest and pectoral muscle area  - Exacerbated by lifting her arm overhead, deep breathing, coughing, or tossing  - Tylenol provides partial relief  - Avoids ibuprofen due to prior medication concerns    Chronic back pain  - Onset three years ago after assembling a headboard, resulting in two weeks of being bedridden  - Pain described as tightness and inability to bend further due to pain  - Aggravated by activities such as folding laundry, bending, sweeping, walking on a treadmill, repetitive motions, and prolonged standing (even with supportive shoes or a mat)  - No radiation of pain down the leg  - No specific movement triggers; worsened by repetitive or prolonged activities  - Tylenol used for relief; no other specific medications taken recently    Impact of occupational and daily activities  - 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      Healthcare Maintenance:  Health Maintenance   Topic Date Due    HPV Vaccines Ages 27-45 (2 - 3-dose series) 01/31/2020    COVID-19 Vaccine (2 - 2024-25 season) 09/01/2024     Influenza Vaccine (1) 10/01/2025    Annual Physical  06/18/2026    Pap Smear  09/15/2026    DTaP,Tdap,and Td Vaccines (3 - Td or Tdap) 01/03/2030    Annual Depression Screening  Completed    Pneumococcal Vaccine: Birth to 50yrs  Aged Out    Meningococcal B Vaccine  Aged Out          PE:  Vital Signs    07/11/25 1441   BP: 104/72   Pulse: 90     Wt Readings from Last 3 Encounters:   07/11/25 174 lb 12.8 oz (79.3 kg)   06/18/25 179 lb 3.2 oz (81.3 kg)   11/27/24 186 lb (84.4 kg)     Body mass index is 27.58 kg/m².     Physical Exam:  GEN: Well-appearing, NAD, nontoxic  Physical Exam  MUSCULOSKELETAL: Pain in right shoulder on arm elevation. No tenderness in low back on palpation. Normal mobility in back. No pain on Coyle test for impingement in right shoulder  Shoulder: R chest wall ttp/pain, right trapezius muscle ttp  Inspection reveals no abnormalities, atrophy or asymmetry.  Palpation is normal with no tenderness over AC joint or bicipital groove.  ROM is full in all planes.  Rotator cuff strength normal throughout.  No signs of impingement with negative Neer and Hawkin's tests, empty can.  Speeds and Yergason's tests normal.  No labral pathology noted with negative Evans's, negative clunk and good stability.  Normal scapular function observed.  No painful arc and no drop arm sign.  No apprehension sign     No visits with results within 4 Week(s) from this visit.   Latest known visit with results is:   Office Visit on 11/27/2024   Component Date Value Ref Range Status    Glucose Urine 11/27/2024 Negative  Negative mg/dL Final    Bilirubin Urine 11/27/2024 Small (A)  Negative Final    Ketones, UA 11/27/2024 Trace  Negative - Trace mg/dL Final    Spec Gravity 11/27/2024 1.025  1.005 - 1.030 Final    Blood Urine 11/27/2024 Negative  Negative Final    PH Urine 11/27/2024 6.5  5.0 - 8.0 Final    Protein Urine 11/27/2024 30 (A)  Negative - Trace mg/dL Final    Urobilinogen Urine 11/27/2024 0.2  0.2 - 1.0 mg/dL Final     Nitrite Urine 11/27/2024 Negative  Negative Final    Leukocyte Esterase Urine 11/27/2024 Small (A)  Negative Final    APPEARANCE 11/27/2024 clear  Clear Final    Color Urine 11/27/2024 yellow  Yellow Final    Multistix Lot# 11/27/2024 [id number]  Numeric Final    Multistix Expiration Date 11/27/2024 6/30/25  Date Final    Urine Culture 11/27/2024 <10,000 CFU/ML Gram negative pio   Final        A/P: Lucila Rader is 34 year old presenting for the following:  Assessment & Plan  Muscle strain of the right shoulder and chest wall  Pain in right shoulder and chest wall due to muscle strain, worsens with overhead movements. Negative Coyle test indicates chest wall muscle involvement. No MRI needed. Emphasized maintaining range of motion.  - Prescribed muscle relaxer for nighttime use, cautioning against drowsiness-related activities.  - Prescribed strong ibuprofen for pain management.  - Advised continued use of Tylenol as needed.  - Instructed on gentle range of motion exercises for shoulder.    Chronic low back pain  Low back pain exacerbated by activities, described as tightness without leg radiation. Decent mobility but pain with repetitive motions. Core strengthening exercises discussed. Physical therapy recommended.  - Ordered physical therapy for core strengthening exercises.  - Advised checking insurance coverage for physical therapy sessions.  - Recommended starting with one or two physical therapy sessions for individualized exercise recommendations to continue at home.       Encounter Medications[3]        Side effects, risks and benefits of medications were explained.  The patient or responsible adult showed the ability to learn, asked appropriate questions.  There were no barriers to learning and they verbalized understanding of the treatment plan.     Medication list provided to patient and /or family member.        Venice Galeano MD       [1]   Patient Active Problem List  Diagnosis    Major depressive  disorder, recurrent, mild    PTSD (post-traumatic stress disorder)    CARMENCITA (generalized anxiety disorder)    Chronic constipation   [2] History reviewed. No pertinent past medical history.  [3]   Outpatient Encounter Medications as of 7/11/2025   Medication Sig Dispense Refill    busPIRone HCl 7.5 MG Oral Tab Take 1 tablet (7.5 mg total) by mouth daily.      cyclobenzaprine 5 MG Oral Tab Take 1 tablet (5 mg total) by mouth nightly as needed for Muscle spasms. 10 tablet 0    ibuprofen 600 MG Oral Tab Take 1 tablet (600 mg total) by mouth every 6 (six) hours as needed for Pain. 20 tablet 0    FLUoxetine HCl 60 MG Oral Tab       CONCERTA 54 MG Oral Tab CR       clotrimazole 1 % External Cream Apply 1 Application topically 2 (two) times daily. 42 g 0    propranolol 10 MG Oral Tab Take 1 tablet (10 mg total) by mouth 2 (two) times daily as needed.      fluconazole (DIFLUCAN) 150 MG Oral Tab Take one tablet by mouth today, then one tablet by mouth on day 5. 2 tablet 0    traZODone 50 MG Oral Tab Take 1 tablet (50 mg total) by mouth nightly.      hydrOXYzine 25 MG Oral Tab Take 1 tablet (25 mg total) by mouth daily as needed for Anxiety. ! Tablet by mouth 3 times daily as needed (Patient not taking: Reported on 7/11/2025) 30 tablet 0     No facility-administered encounter medications on file as of 7/11/2025.

## 2025-08-07 ENCOUNTER — TELEPHONE (OUTPATIENT)
Dept: FAMILY MEDICINE CLINIC | Facility: CLINIC | Age: 35
End: 2025-08-07

## 2025-08-08 ENCOUNTER — OFFICE VISIT (OUTPATIENT)
Dept: FAMILY MEDICINE CLINIC | Facility: CLINIC | Age: 35
End: 2025-08-08

## 2025-08-08 VITALS
WEIGHT: 175.63 LBS | DIASTOLIC BLOOD PRESSURE: 72 MMHG | HEIGHT: 66.75 IN | TEMPERATURE: 98 F | OXYGEN SATURATION: 96 % | BODY MASS INDEX: 27.56 KG/M2 | RESPIRATION RATE: 16 BRPM | HEART RATE: 107 BPM | SYSTOLIC BLOOD PRESSURE: 100 MMHG

## 2025-08-08 DIAGNOSIS — M25.511 ACUTE PAIN OF RIGHT SHOULDER: Primary | ICD-10-CM

## 2025-08-08 PROCEDURE — 99214 OFFICE O/P EST MOD 30 MIN: CPT | Performed by: STUDENT IN AN ORGANIZED HEALTH CARE EDUCATION/TRAINING PROGRAM

## 2025-08-08 RX ORDER — BUPROPION HYDROCHLORIDE 150 MG/1
200 TABLET ORAL EVERY MORNING
COMMUNITY
Start: 2025-07-24